# Patient Record
Sex: MALE | Race: WHITE | NOT HISPANIC OR LATINO
[De-identification: names, ages, dates, MRNs, and addresses within clinical notes are randomized per-mention and may not be internally consistent; named-entity substitution may affect disease eponyms.]

---
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
## 2023-06-14 ENCOUNTER — APPOINTMENT (OUTPATIENT)
Dept: OTOLARYNGOLOGY | Facility: CLINIC | Age: 23
End: 2023-06-14
Payer: COMMERCIAL

## 2023-06-14 ENCOUNTER — NON-APPOINTMENT (OUTPATIENT)
Age: 23
End: 2023-06-14

## 2023-06-14 VITALS
WEIGHT: 215 LBS | SYSTOLIC BLOOD PRESSURE: 108 MMHG | TEMPERATURE: 98.1 F | BODY MASS INDEX: 28.49 KG/M2 | DIASTOLIC BLOOD PRESSURE: 72 MMHG | HEIGHT: 73 IN | HEART RATE: 56 BPM

## 2023-06-14 DIAGNOSIS — R04.0 EPISTAXIS: ICD-10-CM

## 2023-06-14 PROCEDURE — 31231 NASAL ENDOSCOPY DX: CPT

## 2023-06-14 PROCEDURE — 99203 OFFICE O/P NEW LOW 30 MIN: CPT | Mod: 25

## 2023-06-14 NOTE — PHYSICAL EXAM
[Nasal Endoscopy Performed] : nasal endoscopy was performed, see procedure section for findings [] : septum deviated to the right [Normal] : inferior turbinates and middle turbinates are normal [de-identified] : engorged vessels B/L ant septum worse on L

## 2023-06-14 NOTE — ASSESSMENT
[FreeTextEntry1] : Epistaxis: scope show engorged vessels bilateral anterior septum more on Left\par \par - r/b/a cautery with silver nitrate vs increased moisturization with vaseline\par - pt wishes to proceed with moisturization\par - vaseline ointment to nares at night for at least 2 weeks - ointment placed today\par - Ayr saline gel on finger during the day\par - instructions given for Afrin use with acute episodes of bleeding not for use more than 3 days in a row\par \par - pt will call and let us know if bleeding persists after 2 weeks and will consider cautery\par

## 2023-06-14 NOTE — HISTORY OF PRESENT ILLNESS
[de-identified] : Mr. FLYNN is a 22 year male with bilateral epistaxis worse on Left for many years, they usually stop within 5 min, he bleeds a few times per week, has never seen an ENT\par - denies anti coagulants or nasal congestion\par - denies nasal trauma\par - mom states she has frequent nose bleeds as well\par

## 2023-06-14 NOTE — END OF VISIT
[FreeTextEntry3] : I personally saw and examined ASHLEY GEE in detail.  I spoke to JAC Dao regarding the assessment and plan of care. I performed the procedures and relevant physical exam.  I have reviewed the above assessment and plan of care and I agree.  I have made changes to the body of the note wherever necessary and appropriate.\par

## 2023-06-14 NOTE — PROCEDURE
[FreeTextEntry6] : reason for exam: anterior rhinoscopy insufficient for symptom evaluation \par \par Fiberoptic nasal endoscopy was performed.  R/b/a of procedure was explained to the patient and they agreed to proceed with procedure.   Significant factors noted: ENGORGED VESSELS BILATERAL ANTERIOR SEPTUM  Otherwise normal mucosa, normal b/l inferior, middle and superior turbinates, inferior, middle and superior meati and sphenoethmoidal recess.  No nasal polyps.  SEPTUM DEVIATED RIGHT\par \par scope #:\par

## 2023-06-14 NOTE — CONSULT LETTER
[Dear  ___] : Dear  [unfilled], [Consult Letter:] : I had the pleasure of evaluating your patient, [unfilled]. [Sincerely,] : Sincerely, [FreeTextEntry3] : Leora Solorzano MD\par Otolaryngology- Facial Plastics \par [address] [facility] [address]\[place] [address]\par (P) - [phone]\par (F) - [phone]\par

## 2023-07-11 ENCOUNTER — APPOINTMENT (OUTPATIENT)
Dept: OTOLARYNGOLOGY | Facility: CLINIC | Age: 23
End: 2023-07-11

## 2023-07-18 ENCOUNTER — EMERGENCY (EMERGENCY)
Facility: HOSPITAL | Age: 23
LOS: 1 days | Discharge: LEFT BEFORE DISCHARGE | End: 2023-07-18
Admitting: EMERGENCY MEDICINE
Payer: COMMERCIAL

## 2023-07-18 VITALS
SYSTOLIC BLOOD PRESSURE: 126 MMHG | TEMPERATURE: 98 F | RESPIRATION RATE: 18 BRPM | HEART RATE: 91 BPM | OXYGEN SATURATION: 100 % | DIASTOLIC BLOOD PRESSURE: 73 MMHG

## 2023-07-18 PROCEDURE — L9991: CPT

## 2023-07-18 NOTE — ED ADULT TRIAGE NOTE - CHIEF COMPLAINT QUOTE
Pt was playing basket ball and sustained laceration to his chin. A small laceration noted. c/o pain . Last tetanus unknown.